# Patient Record
Sex: MALE | Race: OTHER | Employment: FULL TIME | URBAN - METROPOLITAN AREA
[De-identification: names, ages, dates, MRNs, and addresses within clinical notes are randomized per-mention and may not be internally consistent; named-entity substitution may affect disease eponyms.]

---

## 2017-01-31 ENCOUNTER — OFFICE VISIT (OUTPATIENT)
Dept: FAMILY MEDICINE CLINIC | Age: 32
End: 2017-01-31

## 2017-01-31 VITALS — HEIGHT: 67 IN | WEIGHT: 166 LBS | BODY MASS INDEX: 26.06 KG/M2

## 2017-01-31 DIAGNOSIS — E73.9 LACTOSE INTOLERANCE IN ADULT: ICD-10-CM

## 2017-01-31 DIAGNOSIS — K29.50 OTHER CHRONIC GASTRITIS WITHOUT HEMORRHAGE: Primary | ICD-10-CM

## 2017-01-31 RX ORDER — OMEPRAZOLE 40 MG/1
40 CAPSULE, DELAYED RELEASE ORAL DAILY
Qty: 30 CAP | Refills: 0 | Status: SHIPPED | OUTPATIENT
Start: 2017-01-31 | End: 2017-10-17 | Stop reason: SDUPTHER

## 2017-01-31 NOTE — PROGRESS NOTES
Assessment/Plan:       ICD-10-CM ICD-9-CM    1. Other chronic gastritis without hemorrhage K29.50  omeprazole (PRILOSEC) 40 mg capsule   2. Lactose intolerance in adult E73.9 271.3      Follow-up Disposition:  Return if symptoms worsen or fail to improve. [facility]  Subjective:   Yusra Ocampo is a 27 y.o. OTHER male who speaks Lao. Chief Complaint   Patient presents with    Epigastric Pain     pt c/o severe pain after eating and diarreah x2 years but worse x1 month, pain radiates to the back    Head Pain     x1 year    History of Present Illness: diarrhea depends on the food. Has pain every day after eating, last time was last night. At 9 pm, eats once a day. The rest of the day drinks water. When he eats, his stomach bloats. For 3 months has been eating once a day. Has had the pain for 10 years but it's been worse over 3 months. Had gastritis in [facility] 17 years ago. Fried chicken, homemade rice,  Bananas fried but not spicy, and water. Moves to the back. Stays for an hour. Takes tylenol, but usually goes away by itself. No diarrhea last night. Milk and lactose gives him diarrhea. No constipation. No blood in stool. Has nausea sometimes but not vomiting. Feels like the food is stuck and causes pain. Drinks orange juice but that gives him acid, oatmeal, water. Drinks Aloe Vera juice and that helps. He weighs between 160-180. Sometimes feels short of breath and is asking for something for his blood pressure. When he is worried about something he feels stabbing pain. Review of Systems: Negative for: fever, shortness of breath, leg swelling, exertional dyspnea, palpitations. Past Surgical History: He  has no past surgical history on file. Social History: He  reports that he has never smoked. He does not have any smokeless tobacco history on file. He reports that he does not drink alcohol or use illicit drugs.   Objective:   138 82  Vitals:    01/31/17 0952 Weight: 166 lb (75.3 kg)   Height: 5' 7.13\" (1.705 m)    No LMP for male patient. Wt Readings from Last 2 Encounters:   01/31/17 166 lb (75.3 kg)   04/19/16 173 lb 3.2 oz (78.6 kg)     Lab Review:No results found for any visits on 01/31/17. Physical Examination:   General appearance - well developed, no acute distress. Chest - clear to auscultation. Heart - regular rate and rhythm without murmurs, rubs, or gallops. Abdomen - bowel sounds present x 4, ND; tender low abdomen, midline; no CVAT; no rebound tenderness. Extremities - pulses intact. No peripheral edema. Assessment/Plan:   Fartun Jin was seen today for epigastric pain and head pain. Diagnoses and all orders for this visit:    Other chronic gastritis without hemorrhage  -     omeprazole (PRILOSEC) 40 mg capsule; Take 1 Cap by mouth daily. For reflux (acid); Ana 1 tab diario para acides    Lactose intolerance in adult      Follow-up Disposition:  Return if symptoms worsen or fail to improve.   -no caffeine/sodas; eat more than once a day and in smaller quantities; take new medication for 1 month; reinforce no milk products; no fried foods  Megan Schuster, MSN, RN, FNP-BC, BC-ADM  Celina Menjivar expressed understanding of this plan.

## 2017-01-31 NOTE — PROGRESS NOTES
Statements below were documented by Les Huang RN Patient discharged home with AVS and Medication teaching . No further questions. Reviewed patient's medications, how to take, where to pickup and if any refills, patient verbalized understanding and has no questions. My  for this patient visit was. Razia Brunner.  Les Huang RN

## 2017-01-31 NOTE — PROGRESS NOTES
Coordination of Care  1. Have you been to the ER, urgent care clinic since your last visit? Hospitalized since your last visit? Yes When: [facility] Where: 04/2016 back pain      2. Have you seen or consulted any other health care providers outside of the [address] [place] [address] since your last visit? Include any pap smears or colon screening. No    Medications  Medication Reconciliation Performed: no  Patient does not need refills     Learning Assessment Complete?  yes

## 2017-01-31 NOTE — PATIENT INSTRUCTIONS
Enfermedad de reflujo gastroesofágico (GERD): Instrucciones de cuidado - [ Gastroesophageal Reflux Disease (GERD): Care Instructions ]  Instrucciones de cuidado    La enfermedad de reflujo gastroesofágico (GERD, por taj siglas en inglés) es cuando el ácido estomacal se devuelve hacia el esófago. El esófago es el conducto que va de la garganta al [facility]. Patricia válvula de patricia roxanna vía brody que el ácido del estómago se devuelva por bhavya conducto. Cuando usted tiene GERD, esta válvula no se tania lo suficientemente firme. Si usted tiene síntomas leves de GERD, jesica acidez estomacal, es posible que pueda controlar el problema con antiácidos o medicamentos de [employer]. Cambiar la alimentación, bajar de peso y hacer otros cambios en el estilo de heber también pueden ayudar a reducir los síntomas. La atención de seguimiento es patricia parte clave de moreno tratamiento y seguridad. Asegúrese de hacer y acudir a todas las citas, y llame a moreno médico si está teniendo problemas. También es patricia buena idea saber los resultados de los exámenes y mantener patricia lista de los medicamentos que yanedl. ¿Cómo puede cuidarse en el hogar? · [employer] medicamentos exactamente jesica le fueron recetados. Llame a moreno médico si ivan estar teniendo problemas con moreno medicamento. · Moreno médico le podría recomendar medicamentos de [employer]. Para la indigestión leve u ocasional pueden servirle los antiácidos jesica Tums, Gaviscon, Mylanta o Maalox. Moreno médico también podría recomendar reductores de ácido de venta johanny, jesica Pepcid AC, Tagamet HB, Zantac 75 o Prilosec. Julia y siga todas las instrucciones de la [place]. Si [place] estos medicamentos con frecuencia, hable con moreno médico.  · Cambie taj hábitos alimenticios. ¨ Es mejor comer varias comidas pequeñas en lugar de dos o alicia comidas abundantes. ¨ Después de comer, espere de 2 a 3 horas antes de recostarse. ¨ El chocolate, la menta y el alcohol pueden empeorar la GERD.   ¨ En [employer], los alimentos condimentados, los alimentos que tienen mucho ácido (jesica los tomates y las naranjas) y el café pueden empeorar los síntomas de la GERD. Si shawanda síntomas empeoran después de comer un determinado alimento, se recomienda que deje de comer hal alimento para gordon si shawanda síntomas mejoran. · No fume ni masque tabaco. Fumar puede agravar la GERD. Si necesita ayuda para dejar de usar tabaco, hable con moreno médico [employer] y medicamentos para dejar de usarlo. Éstos pueden aumentar shawanda probabilidades de dejar el hábito para siempre. · Si tiene síntomas de GERD terry la noche, eleve la cabecera de moreno cama entre 6 y 6 pulgadas (entre 15 y 20 cm) colocando ladrillos debajo del rebecca de la cama o patricia cuña de espuma debajo de la cabecera del colchón. (Usar almohadas adicionales no funciona). · No use ropa que le ajuste mucho la parte media del cuerpo. · Baje de peso, si necesita hacerlo. Perder sólo de 5 a 10 libras (2.3 a 4.5 kg) puede ayudarle. ¿Cuándo debe pedir ayuda? Llame a moreno médico ahora mismo o busque atención médica inmediata si:  · Tiene un dolor de estómago nuevo o distinto. · Shawanda heces son negruzcas y parecidas al alquitrán o tienen rastros de Analia Garza. Preste especial atención a los cambios en moreno sandy y asegúrese de comunicarse con moreno médico si:  · Shawanda síntomas no gracia troy después de 2 días. · La comida parece quedarle atorada en la garganta o el pecho. ¿Dónde puede encontrar más información en inglés? Camille Vega a [email or web address] Ida Blanco K726 en la búsqueda para aprender más acerca de \"Enfermedad de reflujo gastroesofágico (GERD): Instrucciones de cuidado - [ Gastroesophageal Reflux Disease (GERD): Care Instructions ]. \"  Revisado: 9 agosto, 2016  Versión del contenido: 11.1  © 6064-5785 [employer], [employer]. Las instrucciones de cuidado fueron adaptadas bajo licencia por [employer] (which disclaims liability or warranty for this information). Si usted tiene [place] afección médica o sobre estas instrucciones, siempre pregunte a moreno profesional de sandy. [facility] niega toda garantía o responsabilidad por moreno uso de esta información.

## 2017-10-02 ENCOUNTER — HOSPITAL ENCOUNTER (OUTPATIENT)
Dept: LAB | Age: 32
Discharge: HOME | End: 2017-10-02

## 2017-10-02 ENCOUNTER — OFFICE VISIT (OUTPATIENT)
Dept: FAMILY MEDICINE CLINIC | Age: 32
End: 2017-10-02

## 2017-10-02 VITALS
DIASTOLIC BLOOD PRESSURE: 81 MMHG | HEART RATE: 61 BPM | BODY MASS INDEX: 27.09 KG/M2 | SYSTOLIC BLOOD PRESSURE: 132 MMHG | TEMPERATURE: 97.7 F | WEIGHT: 173.6 LBS

## 2017-10-02 DIAGNOSIS — R10.30 LOWER ABDOMINAL PAIN: Primary | ICD-10-CM

## 2017-10-02 DIAGNOSIS — R10.30 LOWER ABDOMINAL PAIN: ICD-10-CM

## 2017-10-02 DIAGNOSIS — Z83.79 FAMILY HISTORY OF COLITIS IN MOTHER: ICD-10-CM

## 2017-10-02 DIAGNOSIS — R19.5 CHANGE IN CONSISTENCY OF STOOL: ICD-10-CM

## 2017-10-02 DIAGNOSIS — R19.8 ALTERNATING CONSTIPATION AND DIARRHEA: ICD-10-CM

## 2017-10-02 LAB
ALBUMIN SERPL-MCNC: 4.6 G/DL (ref 3.5–5)
ALBUMIN/GLOB SERPL: 1.3 {RATIO} (ref 1.1–2.2)
ALP SERPL-CCNC: 93 U/L (ref 45–117)
ALT SERPL-CCNC: 103 U/L (ref 12–78)
ANION GAP SERPL CALC-SCNC: 4 MMOL/L (ref 5–15)
AST SERPL-CCNC: 32 U/L (ref 15–37)
BASOPHILS # BLD: 0 K/UL (ref 0–0.1)
BASOPHILS NFR BLD: 0 % (ref 0–1)
BILIRUB SERPL-MCNC: 0.5 MG/DL (ref 0.2–1)
BILIRUB UR QL STRIP: NORMAL
BUN SERPL-MCNC: 15 MG/DL (ref 6–20)
BUN/CREAT SERPL: 21 (ref 12–20)
CALCIUM SERPL-MCNC: 9.4 MG/DL (ref 8.5–10.1)
CHLORIDE SERPL-SCNC: 103 MMOL/L (ref 97–108)
CO2 SERPL-SCNC: 31 MMOL/L (ref 21–32)
CREAT SERPL-MCNC: 0.73 MG/DL (ref 0.7–1.3)
CRP SERPL HS-MCNC: 0.4 MG/L
EOSINOPHIL # BLD: 0.2 K/UL (ref 0–0.4)
EOSINOPHIL NFR BLD: 4 % (ref 0–7)
ERYTHROCYTE [DISTWIDTH] IN BLOOD BY AUTOMATED COUNT: 13.3 % (ref 11.5–14.5)
ERYTHROCYTE [SEDIMENTATION RATE] IN BLOOD: 2 MM/HR (ref 0–15)
GLOBULIN SER CALC-MCNC: 3.5 G/DL (ref 2–4)
GLUCOSE SERPL-MCNC: 88 MG/DL (ref 65–100)
GLUCOSE UR-MCNC: NEGATIVE MG/DL
HCT VFR BLD AUTO: 45.7 % (ref 36.6–50.3)
HGB BLD-MCNC: 15.2 G/DL (ref 12.1–17)
HGB BLD-MCNC: 15.7 G/DL
KETONES P FAST UR STRIP-MCNC: NEGATIVE MG/DL
LYMPHOCYTES # BLD: 2.6 K/UL (ref 0.8–3.5)
LYMPHOCYTES NFR BLD: 53 % (ref 12–49)
MCH RBC QN AUTO: 28.9 PG (ref 26–34)
MCHC RBC AUTO-ENTMCNC: 33.3 G/DL (ref 30–36.5)
MCV RBC AUTO: 86.9 FL (ref 80–99)
MONOCYTES # BLD: 0.4 K/UL (ref 0–1)
MONOCYTES NFR BLD: 9 % (ref 5–13)
NEUTS SEG # BLD: 1.6 K/UL (ref 1.8–8)
NEUTS SEG NFR BLD: 34 % (ref 32–75)
PH UR STRIP: 6 [PH] (ref 4.6–8)
PLATELET # BLD AUTO: 191 K/UL (ref 150–400)
POTASSIUM SERPL-SCNC: 4.7 MMOL/L (ref 3.5–5.1)
PROT SERPL-MCNC: 8.1 G/DL (ref 6.4–8.2)
PROT UR QL STRIP: NEGATIVE MG/DL
RBC # BLD AUTO: 5.26 M/UL (ref 4.1–5.7)
SODIUM SERPL-SCNC: 138 MMOL/L (ref 136–145)
SP GR UR STRIP: 1.03 (ref 1–1.03)
UA UROBILINOGEN AMB POC: NORMAL (ref 0.2–1)
URINALYSIS CLARITY POC: CLEAR
URINALYSIS COLOR POC: YELLOW
URINE BLOOD POC: NEGATIVE
URINE LEUKOCYTES POC: NEGATIVE
URINE NITRITES POC: NEGATIVE
WBC # BLD AUTO: 4.8 K/UL (ref 4.1–11.1)

## 2017-10-02 PROCEDURE — 86141 C-REACTIVE PROTEIN HS: CPT | Performed by: FAMILY MEDICINE

## 2017-10-02 PROCEDURE — 80053 COMPREHEN METABOLIC PANEL: CPT | Performed by: FAMILY MEDICINE

## 2017-10-02 PROCEDURE — 85652 RBC SED RATE AUTOMATED: CPT | Performed by: FAMILY MEDICINE

## 2017-10-02 PROCEDURE — 85025 COMPLETE CBC W/AUTO DIFF WBC: CPT | Performed by: FAMILY MEDICINE

## 2017-10-02 NOTE — PROGRESS NOTES
Results for orders placed or performed in visit on 10/02/17   AMB POC HEMOGLOBIN (HGB)   Result Value Ref Range    Hemoglobin (POC) 15.7    AMB POC URINALYSIS DIP STICK AUTO W/O MICRO   Result Value Ref Range    Color (UA POC) Yellow     Clarity (UA POC) Clear     Glucose (UA POC) Negative Negative    Bilirubin (UA POC)  Negative    Ketones (UA POC) Negative Negative    Specific gravity (UA POC) 1.030 1.001 - 1.035    Blood (UA POC) Negative Negative    pH (UA POC) 6.0 4.6 - 8.0    Protein (UA POC) Negative Negative mg/dL    Urobilinogen (UA POC) normal 0.2 - 1    Nitrites (UA POC) Negative Negative    Leukocyte esterase (UA POC) Negative Negative

## 2017-10-02 NOTE — MR AVS SNAPSHOT
Visit Information Kiesha Beltre Personal Médico Departamento Teléfono del Dep. Número de visita  
 10/2/2017  1:30 PM Jacek Joseph MD [place] [address] [address]ales [phone] Upcoming Health Maintenance Date Due DTaP/Tdap/Td series (1 - Tdap) 10/29/2005 INFLUENZA AGE 9 TO ADULT 8/1/2017 Alergias  Review Complete El: 10/2/2017 Por: Troy Thompson V A partir del:  10/2/2017 No Known Allergies Vacunas actuales Idalia Brown No hay ninguna vacuna archivada. No revisadas esta visita You Were Diagnosed With   
  
 Dandre Longo Change in consistency of stool    -  Primary ICD-10-CM: R19.5 ICD-9-CM: 787.7 Lower abdominal pain     ICD-10-CM: R10.30 ICD-9-CM: 789.09 Partes vitales PS Pulso Temperatura Peso (percentil de crecimiento) BMI (C) Estatus de tabaquísmo 132/81 (BP 1 Location: Left arm, BP Patient Position: Sitting) 61 97.7 °F (36.5 °C) (Oral) 173 lb 9.6 oz (78.7 kg) 27.09 kg/m2 Never Smoker Historial de signos vitales BMI and BSA Data Body Mass Index Body Surface Area  
 27.09 kg/m 2 1.93 m 2 [facility] Name Phone [facility] [address] [place] [address] Whatley lista de medicamentos actualizada Lista actualizada el: 10/2/17  2:31 PM.  Loi Ochoa use whatley lista de medicamentos más reciente. acetaminophen 325 mg tablet También conocido jesica:  TYLENOL Take  by mouth every four (4) hours as needed for Pain.  
  
 diazePAM 5 mg tablet También conocido jesica:  VALIUM Take 1 Tab by mouth every eight (8) hours as needed (Pain). Max Daily Amount: 15 mg.  
  
 ibuprofen 800 mg tablet También conocido jesica:  MOTRIN Take 1 Tab by mouth every eight (8) hours as needed for Pain. methocarbamol 500 mg tablet También conocido jesica:  ROBAXIN Take 1 tablet by mouth three (3) times daily. omeprazole 40 mg capsule También conocido jesica:  Caesar Collet Take 1 Cap by mouth daily. For reflux (acid); Ana 1 tab diario para acides  
  
 oxyCODONE-acetaminophen 5-325 mg per tablet También conocido jesica:  PERCOCET Take 1-2 Tabs by mouth every six (6) hours as needed for Pain. Max Daily Amount: 8 Tabs. Hicimos lo siguiente AMB POC HEMOGLOBIN (HGB) [11368 CPT(R)] AMB POC URINALYSIS DIP STICK AUTO W/O MICRO [96397 CPT(R)] Por hacer 10/02/2017 Microbiology:  CULTURE, STOOL   
  
 10/02/2017 Lab:  [place], STOOL   
  
 10/02/2017 Microbiology:  OVA & PARASITES, STOOL   
  
 10/02/2017 Lab:  WBC, STOOL Introducing [facility]! [facility] introduce portal paciente MyChart . Ahora se puede acceder a partes de moreno expediente médico, enviar por correo electrónico la oficina de moreno médico y solicitar renovaciones de medicamentos en línea. En moreno navegador de Internet , Ganesh Villanueva a [email or web address] Kyra clic en el usuario por Arelia Arms? [employer] clic aquí en la sesión [facility]. Verá la página de registro [place]. Ingrese moreno código de [employer] savita y jesica aparece a continuación. Usted no tendrá que [employer] código después de yuval completado el proceso de registro . Si usted no se inscribe antes de la fecha de caducidad , debe solicitar un nuevo código. · MyChart Código de acceso : [id number] Expires: 12/31/2017  2:17 PM 
 
Ingresa los últimos cuatro dígitos de moreno Número de Seguro Social ( xxxx ) y fecha de nacimiento ( dd / mm / aaaa ) jesica se indica y kyra clic en Enviar. Usted será llevado a la siguiente página de registro . Crear un ID MyChart . Esta será moreno ID de inicio de sesión de MyChart y no puede ser [place] , por lo que pensar en patricia que es Barbara Primus y fácil de recordar . Crear patricia contraseña MyChart . Usted puede cambiar moreno contraseña en cualquier momento . Ingrese mroeno Password Reset de preguntas y [place] . [place] se puede utilizar en un momento posterior si usted olvida moreno contraseña. Introduzca moreno dirección de correo electrónico . Arcelia Rasmussen recibirá patricia notificación por correo electrónico cuando la nueva información está disponible en MyChart . Minjeydarvin Mount clic en Registrarse. Conway Mable gordon y descargar porciones de moreno expediente médico. 
Matias clic en el enlace de descarga del menú Resumen para descargar patricia copia portátil de moreno información médica . Si tiene [employer] , por favor visite la sección de preguntas frecuentes del sitio web [employer] . Recuerde, [employer] NO es que se utilizará para las necesidades urgentes. Para emergencias médicas , llame al 911 . Ahora disponible en moreno iPhone y Android ! Por favor proporcione bhavya resumen de la documentación de cuidado a moreno próximo proveedor. Your primary care clinician is listed as NONE. If you have any questions after today's visit, please call [phone].

## 2017-10-02 NOTE — PROGRESS NOTES
AVS reviewed and given. Instructions and supplies given for stool H-pylori, stool culture and ova and parasite stool testing. Patient aware to rtc in about 1 month for f/u. Patient verbalized understanding. No questions or concerns at this time.  Rolanda Lehman RN

## 2017-10-02 NOTE — PROGRESS NOTES
Coordination of Care  1. Have you been to the ER, urgent care clinic since your last visit? Hospitalized since your last visit? No    2. Have you seen or consulted any other health care providers outside of the [address] [place] [address] since your last visit? Include any pap smears or colon screening. No    Medications  Medication Reconciliation Performed: no  Patient does not need refills     Learning Assessment Complete?  yes

## 2017-10-02 NOTE — PROGRESS NOTES
Rom Cruz is a 27 y.o. male    Issues discussed today include:    1) Abdominal pain and bloating:  Started 3 years ago. Initially worsening and lately the same. Is constantly tender in lower abd, but pain worsens after eating fatty, spicy and acidic foods. Is 8/10 when most severe. Tried prilosec in Jan, says it didn't help. Hasn't taken any other meds for this. Nothing else alleviates. + off and on diarrhea, but more often has constipation. No blood or mucus in stool. Feels fatigued, occasional dyspnea - these sxs come on suddenly when at rest, not with exertion and pt cannot explain where coming from. Denies anxiety. No h/o abdominal surgery. No blood in stool. No weight loss. He says mother with similar GI sxs. He calls his mother during appt and asks me to talk with her - she has h/o inflammatory colitis, had been on steroid before. She denies being told she had UC or crohn's. No family h/o colon cancer. Data reviewed or ordered today:       Other problems include: There is no problem list on file for this patient. Medications:  Current Outpatient Prescriptions   Medication Sig Dispense Refill    omeprazole (PRILOSEC) 40 mg capsule Take 1 Cap by mouth daily. For reflux (acid); Ana 1 tab diario para acides 30 Cap 0    acetaminophen (TYLENOL) 325 mg tablet Take  by mouth every four (4) hours as needed for Pain. Allergies:  No Known Allergies    LMP:  No LMP for male patient. Social History     Social History    Marital status: SINGLE     Spouse name: N/A    Number of children: N/A    Years of education: N/A     Occupational History    Not on file. Social History Main Topics    Smoking status: Never Smoker    Smokeless tobacco: Never Used    Alcohol use No    Drug use: No    Sexual activity: Not on file     Other Topics Concern    Not on file     Social History Narrative    ** Merged History Encounter **            No family history on file.     ROS:   Chest Pain: No  SOB:  No      Physical Exam  Visit Vitals    /81 (BP 1 Location: Left arm, BP Patient Position: Sitting)    Pulse 61    Temp 97.7 °F (36.5 °C) (Oral)    Wt 173 lb 9.6 oz (78.7 kg)    BMI 27.09 kg/m2     BP Readings from Last 3 Encounters:   10/02/17 132/81   04/19/16 149/82   10/17/14 133/81     Constitutional: Appears well,  No acute distress, Vitals noted  Psychiatric:  Affect normal, Alert and Oriented to person/place/time  Eyes:  Conjunctiva clear, no drainage  ENT:  External ears and nose normal, Teeth and gums appear healthy, Mucous membranes moist  Neck:  General inspection normal. Supple. Heart:  Normal HR, Normal S1 and S2,  Regular rhythm. No murmurs, rubs or gallops.   Lungs:  Clear to auscultation, good respiratory effort, no wheezes, rales or rhonchi  Abdomen: Soft, nondistended, + tenderness in bilateral lower abd, + voluntary guarding, no rebound, no CVAT  Extremities:  Without edema, good peripheral pulses  Skin:  Warm to palpation, without rashes    Results for orders placed or performed in visit on 10/02/17   AMB POC URINALYSIS DIP STICK AUTO W/O MICRO     Status: None   Result Value Ref Range Status    Color (UA POC) Yellow  Final    Clarity (UA POC) Clear  Final    Glucose (UA POC) Negative Negative Final    Bilirubin (UA POC)  Negative     Ketones (UA POC) Negative Negative Final    Specific gravity (UA POC) 1.030 1.001 - 1.035 Final    Blood (UA POC) Negative Negative Final    pH (UA POC) 6.0 4.6 - 8.0 Final    Protein (UA POC) Negative Negative mg/dL Final    Urobilinogen (UA POC) normal 0.2 - 1 Final    Nitrites (UA POC) Negative Negative Final    Leukocyte esterase (UA POC) Negative Negative Final               Lab Results   Component Value Date/Time    Hemoglobin (POC) 15.7 10/02/2017 02:31 PM    HGB 15.2 10/02/2017 02:25 PM         Assessment/Plan:      ICD-10-CM ICD-9-CM    1. Lower abdominal pain R10.30 789.09 AMB POC HEMOGLOBIN (HGB)      CBC WITH AUTOMATED DIFF METABOLIC PANEL, COMPREHENSIVE      SED RATE (ESR)      CRP, HIGH SENSITIVITY      H PYLORI AG, STOOL      AMB POC URINALYSIS DIP STICK AUTO W/O MICRO      CULTURE, STOOL      OVA & PARASITES, STOOL      WBC, STOOL   2. Alternating constipation and diarrhea R19.8 787.99 AMB POC HEMOGLOBIN (HGB)      CBC WITH AUTOMATED DIFF      METABOLIC PANEL, COMPREHENSIVE      CULTURE, STOOL      OVA & PARASITES, STOOL      WBC, STOOL   3. Family history of colitis in mother Z80.78 V21.59 SED RATE (ESR)      CRP, HIGH SENSITIVITY       POC hgb wnl  Check labs to r/o inflammatory process, if so will send to GI for eval  Infectious stool studies    Follow-up Disposition:  Return in about 2 weeks (around 10/16/2017), or if symptoms worsen or fail to improve.         Guzman Turner MD  [address] [place] [address]

## 2017-10-03 PROCEDURE — 87177 OVA AND PARASITES SMEARS: CPT | Performed by: FAMILY MEDICINE

## 2017-10-04 ENCOUNTER — HOSPITAL ENCOUNTER (OUTPATIENT)
Dept: LAB | Age: 32
Discharge: HOME | End: 2017-10-04

## 2017-10-04 ENCOUNTER — CLINICAL SUPPORT (OUTPATIENT)
Dept: FAMILY MEDICINE CLINIC | Age: 32
End: 2017-10-04

## 2017-10-04 DIAGNOSIS — R19.8 ALTERNATING CONSTIPATION AND DIARRHEA: ICD-10-CM

## 2017-10-04 DIAGNOSIS — R10.30 LOWER ABDOMINAL PAIN: ICD-10-CM

## 2017-10-04 DIAGNOSIS — Z13.9 ENCOUNTER FOR SCREENING: Primary | ICD-10-CM

## 2017-10-04 PROCEDURE — 87177 OVA AND PARASITES SMEARS: CPT | Performed by: FAMILY MEDICINE

## 2017-10-04 PROCEDURE — 87045 FECES CULTURE AEROBIC BACT: CPT | Performed by: FAMILY MEDICINE

## 2017-10-04 PROCEDURE — 87338 HPYLORI STOOL AG IA: CPT | Performed by: FAMILY MEDICINE

## 2017-10-04 PROCEDURE — 89055 LEUKOCYTE ASSESSMENT FECAL: CPT | Performed by: FAMILY MEDICINE

## 2017-10-04 NOTE — PROGRESS NOTES
Patient here for labs only, labs dropped off was an WBC Stool, Ova & Parasites Stool x 2, H Pylori ag Stool. Patient was advise that a Dr or Nurse will call with the results if abnormal and if normal no phone call will be done. Also the patient was advised she/he can discuss the results at next visit with the Dr. Norman Lazaro, Medical Assistant.

## 2017-10-05 DIAGNOSIS — R74.8 ELEVATED LIVER ENZYMES: Primary | ICD-10-CM

## 2017-10-05 LAB — WBC #/AREA STL HPF: NORMAL /HPF (ref 0–4)

## 2017-10-05 NOTE — PROGRESS NOTES
Nurse to please call and inform patient of the following (** represent recommendations for patient) - thank you:  ESR wnl, neg inflammatory marker does not support inflammatory/autoimmune colitis  CMP - no kidney, sugar or electrolyte problems identified  ** one liver enzyme is elevated. He should have hepatitis panel to r/o infectious etiology, f/u lab appt recommend and labs ordered. ** recommend appt in 3 months with provider for follow up  CBC - no anemia or elevated WBC.   ** Slightly elevated lymphocyte count, supports viral infection    PLEASE NOTE: addition labs to be resulted in separate note (stool studies pending), ok to wait so all can be discussed in same phone call. Thank you.

## 2017-10-06 LAB
BACTERIA SPEC CULT: NORMAL
C JEJUNI+C COLI AG STL QL: NEGATIVE
E COLI SXT1+2 STL IA: NEGATIVE
SERVICE CMNT-IMP: NORMAL

## 2017-10-07 LAB
H PYLORI AG STL QL IA: POSITIVE
SPECIMEN SOURCE: ABNORMAL

## 2017-10-09 ENCOUNTER — HOSPITAL ENCOUNTER (OUTPATIENT)
Dept: LAB | Age: 32
Discharge: HOME | End: 2017-10-09

## 2017-10-09 ENCOUNTER — CLINICAL SUPPORT (OUTPATIENT)
Dept: FAMILY MEDICINE CLINIC | Age: 32
End: 2017-10-09

## 2017-10-09 DIAGNOSIS — R74.8 ELEVATED LIVER ENZYMES: ICD-10-CM

## 2017-10-09 LAB
O+P SPEC MICRO: NORMAL
O+P SPEC MICRO: NORMAL
O+P STL CONC: NORMAL
SPECIMEN SOURCE: NORMAL
SPECIMEN SOURCE: NORMAL

## 2017-10-09 PROCEDURE — 80074 ACUTE HEPATITIS PANEL: CPT | Performed by: FAMILY MEDICINE

## 2017-10-09 PROCEDURE — 87177 OVA AND PARASITES SMEARS: CPT | Performed by: FAMILY MEDICINE

## 2017-10-09 NOTE — PROGRESS NOTES
O&P stool pending  H. Pylori stool antigen POSITIVE  ** please help patient to make follow to discuss treatment, can discuss other labs results and stool study result at that time

## 2017-10-10 LAB
HAV IGM SER QL: NONREACTIVE
HBV CORE IGM SER QL: NONREACTIVE
HBV SURFACE AG SER QL: 0.4 INDEX
HBV SURFACE AG SER QL: NEGATIVE
HCV AB SERPL QL IA: NONREACTIVE
HCV COMMENT,HCGAC: NORMAL
SP1: NORMAL
SP2: NORMAL
SP3: NORMAL

## 2017-10-10 NOTE — PROGRESS NOTES
2 species of nonpathogenic protozoa identified on stool O&P exam.  Serial O&P specimen collected today. The presence of these organisms suggests h/o ingestion of contaminated water/food, but no treatment is recommended for these organisms. Given + H.  Pylori - we will plan to treat this first.  If sxs persist despite tx, we can consider specialized stool studies with acid fast or flourescent staining to detect other potential lower GI infections  ** please help pt make appt with me or other provider in the next 1-2 weeks, thank you

## 2017-10-13 LAB
O+P SPEC MICRO: NORMAL
O+P STL CONC: NORMAL
SPECIMEN SOURCE: NORMAL

## 2017-10-16 ENCOUNTER — TELEPHONE (OUTPATIENT)
Dept: FAMILY MEDICINE CLINIC | Age: 32
End: 2017-10-16

## 2017-10-16 NOTE — PROGRESS NOTES
Pt is coming to clinic tomorrow to see provider for follow up appointment. I did talk to him today as he called to ask about labs. We only discussed the need to come back to treat him with antibiotics for H.Pylori and to explain more thoroughly his stool results. He does not know he needs to have hepatitis panel. I sent a message to Dr. Ashly Bardales to alert him of follow up appointment with her tomorrow and to let her know these additional labs can be discussed tomorrow.  Sarah Gr RN

## 2017-10-16 NOTE — TELEPHONE ENCOUNTER
----- Message from Kiarra Perez RN sent at 10/16/2017  3:21 PM EDT -----  Pt is coming to clinic tomorrow to see provider for follow up appointment. I did talk to him today as he called to ask about labs. We only discussed the need to come back to treat him with antibiotics for H.Pylori and to explain more thoroughly his stool results. He does not know he needs to have hepatitis panel. I sent a message to Dr. Fernando Sahni to alert him of follow up appointment with her tomorrow and to let her know these additional labs can be discussed tomorrow.  Kiarra Perez RN

## 2017-10-16 NOTE — PROGRESS NOTES
I called pt today with [employer]  # [phone] and gave message from provider. Pt said his stomach is really bothering him and he is anxious to be seen. I scheduled him for appointment tomorrow at end of provider[facility].  Marianela Yang RN

## 2017-10-17 ENCOUNTER — OFFICE VISIT (OUTPATIENT)
Dept: FAMILY MEDICINE CLINIC | Age: 32
End: 2017-10-17

## 2017-10-17 VITALS
SYSTOLIC BLOOD PRESSURE: 128 MMHG | DIASTOLIC BLOOD PRESSURE: 77 MMHG | TEMPERATURE: 98.2 F | HEIGHT: 68 IN | WEIGHT: 175 LBS | HEART RATE: 58 BPM | BODY MASS INDEX: 26.52 KG/M2

## 2017-10-17 DIAGNOSIS — R10.30 LOWER ABDOMINAL PAIN: ICD-10-CM

## 2017-10-17 DIAGNOSIS — A04.8 H. PYLORI INFECTION: Primary | ICD-10-CM

## 2017-10-17 RX ORDER — CLARITHROMYCIN 500 MG/1
500 TABLET, FILM COATED ORAL 2 TIMES DAILY
Qty: 28 TAB | Refills: 0 | Status: SHIPPED | OUTPATIENT
Start: 2017-10-17 | End: 2017-10-31

## 2017-10-17 RX ORDER — OMEPRAZOLE 40 MG/1
40 CAPSULE, DELAYED RELEASE ORAL DAILY
Qty: 30 CAP | Refills: 2 | Status: SHIPPED | OUTPATIENT
Start: 2017-10-17 | End: 2017-11-21 | Stop reason: SDUPTHER

## 2017-10-17 RX ORDER — AMOXICILLIN 500 MG/1
1000 CAPSULE ORAL 2 TIMES DAILY
Qty: 56 CAP | Refills: 0 | Status: SHIPPED | OUTPATIENT
Start: 2017-10-17 | End: 2017-10-31

## 2017-10-17 NOTE — PROGRESS NOTES
Patient seen for discharge and no  needed per the patient. Per [facility], the best price for the patient's prescriptions today is at [facility]. He agreed to have them sent to the [facility] and [address] [place] [address]. He was given the printed Good Rx coupons, a Good Rx coupon card, and the AVS. He understands to take both antibiotic prescriptions concurrently for the full two weeks and to take the Omeprazole daily as well for 3 months. Return as a walk-in as needed.  Jagdish Aldana RN

## 2017-10-17 NOTE — PROGRESS NOTES
Coordination of Care  1. Have you been to the ER, urgent care clinic since your last visit? Hospitalized since your last visit? No    2. Have you seen or consulted any other health care providers outside of the [address] [place] [address] since your last visit? Include any pap smears or colon screening. No    Medications  Does the patient need refills? NO    Learning Assessment Complete?  yes

## 2017-10-17 NOTE — PATIENT INSTRUCTIONS
Infección bacteriana por H. pylori: Instrucciones de cuidado - [ H. Pylori Bacterial Infection: Care Instructions ]  Instrucciones de cuidado    Moreno prueba muestra la presencia de Helicobacter pylori (H. pylori), un tipo de bacteria que vive en el revestimiento del estómago. Muchas personas tienen H. pylori en taj estómagos y no tienen problemas. Jessee algunas veces la H. pylori causa malestar estomacal o patricia llaga (úlcera) en el revestimiento del estómago. La mayoría de las úlceras estomacales son causadas por la H. pylori. Entre los síntomas de patricia úlcera se encuentran dolor persistente o abrasador en el vientre que puede durar desde unos minutos hasta horas. El consumo de alimentos o antiácidos ayuda a aliviar el dolor, [employer] síntomas podrían reaparecer después de un Shannan. Un antibiótico puede curar patricia infección por H. pylori. La atención de seguimiento es patricia parte clave de moreno tratamiento y seguridad. Asegúrese de hacer y acudir a todas las citas, y llame a moreno médico si está teniendo problemas. También es patricia buena idea saber los resultados de los exámenes y mantener patricia lista de los medicamentos que yandel. Cómo puede cuidarse en el hogar? · [address] No deje de tomarlos por el hecho de sentirse mejor. Debe joslyn todos los antibióticos hasta terminarlos. · Si moreno médico le receta otros medicamentos, tómelos exactamente según las indicaciones. Llame a moreno médico si ivan estar teniendo problemas con moreno medicamento. Recibirá más detalles sobre el medicamento específico recetado por moreno médico.  · Siga patricia dieta saludable y balanceada. ¨ Coma comidas más pequeñas, con mayor frecuencia. Asegúrese de consumir por lo menos alicia comidas al día. ¨ Evite los alimentos grasosos o muy condimentados. ¨ No tome bebidas que contengan cafeína si le hacen mal al [place]. Éstas incluyen café, té y sodas. · No fume.  El hábito de fumar demora la curación de moreno [place] y [place] provocar la recurrencia de Ronn Casey. Si necesita ayuda para dejar de fumar, hable con moreno médico [employer] y medicamentos para dejar de fumar. Éstos pueden aumentar shawanda probabilidades de dejar el hábito para siempre. · Limite la cantidad de alcohol que stanislav. El alcohol puede retrasar la curación de la úlcera y puede [employer] síntomas. · Lávese las cayetano después de ir al baño. · Evite joslyn aspirina, ibuprofeno u otros antiinflamatorios, ya que pueden irritar el estómago. Si necesita un analgésico (medicamento para el dolor), trate de joslyn acetaminofén (Tylenol). Cuándo debe pedir ayuda? Llame al 911 en cualquier momento que considere que necesita atención de emergencia. Por ejemplo, llame si:  · Se desmayó (perdió el conocimiento). · Vomita monica o algo parecido a granos de café molido. · Las heces son de color rojizo o muy sanguinolentas (con monica). Llame a moreno médico ahora mismo o busque atención médica inmediata si:  · Tiene dolor intenso en el vientre, la espalda o los hombros.  · Tiene dolor abdominal nuevo o que empeora. · Siente mareos o aturdimiento, o que está a punto de [place]. · Shawanda heces son negruzcas y parecidas al alquitrán o tienen rastros de [place]. Preste especial atención a los cambios en moreno sandy y asegúrese de comunicarse con moreno médico si:  · Tiene síntomas nuevos jesica pérdida de peso, náuseas o vómito. · No mejora jesica se esperaba. Dónde puede encontrar más información en inglés? Shelby Lidia a http://radha-sandy.info/. Lynda Lebron U025 en la búsqueda para aprender más acerca de \"Infección bacteriana por H. pylori: Instrucciones de cuidado - [ H. Pylori Bacterial Infection: Care Instructions ]. \"  Revisado: 21 noviembre, 2016  Versión del contenido: 11.3  © 5430-7493 [employer], [employer]. Las instrucciones de cuidado fueron adaptadas bajo licencia por [employer] (which disclaims liability or warranty for this information).  Si usted tiene preguntas sobre patricia afección médica o sobre estas instrucciones, siempre pregunte a moreno profesional de sandy. [employer] niega toda garantía o responsabilidad por moreno uso de esta información.

## 2017-10-21 NOTE — PROGRESS NOTES
Jayashree Weiner is a 27 y.o. male    Issues discussed today include:    1) Abd pain, bloating follow up:  Here to f/u on labs and talk about tx. Had stool studies recently, showed + h pylori infection. Pt denies ever being tx'd for this in past. Pt c/o lower abd pain and bloating. Off and on diarrhea. Not eating much bc he is avoiding to prevent pain, diarrhea. Feels fatigued. Data reviewed or ordered today:       Other problems include: There is no problem list on file for this patient. Medications:  Current Outpatient Prescriptions   Medication Sig Dispense Refill    clarithromycin (BIAXIN) 500 mg tablet Take 1 Tab by mouth two (2) times a day for 14 days. 28 Tab 0    amoxicillin (AMOXIL) 500 mg capsule Take 2 Caps by mouth two (2) times a day for 14 days. 56 Cap 0    omeprazole (PRILOSEC) 40 mg capsule Take 1 Cap by mouth daily. For reflux (acid); Ana 1 tab diario para acides 30 Cap 2    acetaminophen (TYLENOL) 325 mg tablet Take  by mouth every four (4) hours as needed for Pain. Allergies:  No Known Allergies    LMP:  No LMP for male patient. Social History     Social History    Marital status: SINGLE     Spouse name: N/A    Number of children: N/A    Years of education: N/A     Occupational History    Not on file. Social History Main Topics    Smoking status: Never Smoker    Smokeless tobacco: Never Used    Alcohol use No    Drug use: No    Sexual activity: Not on file     Other Topics Concern    Not on file     Social History Narrative    ** Merged History Encounter **            No family history on file.     ROS:   Chest Pain:  No  SOB:  No      Physical Exam  Visit Vitals    /77 (BP 1 Location: Left arm, BP Patient Position: Sitting)    Pulse (!) 58    Temp 98.2 °F (36.8 °C) (Oral)    Ht 5' 7.52\" (1.715 m)    Wt 175 lb (79.4 kg)    BMI 26.99 kg/m2     BP Readings from Last 3 Encounters:   10/17/17 128/77   10/02/17 132/81   04/19/16 149/82 Constitutional: Appears well,  No acute distress, Vitals noted  Psychiatric:  Affect normal, Alert and Oriented to person/place/time  Eyes:  Conjunctiva clear, no drainage  ENT:  External ears and nose normal, Teeth and gums appear healthy, Mucous membranes moist  Neck:  General inspection normal. Supple. Heart:  Normal HR, Normal S1 and S2,  Regular rhythm. No murmurs, rubs or gallops. Lungs:  Clear to auscultation, good respiratory effort, no wheezes, rales or rhonchi  Abdomen: + hyperactive BS, soft, nondistended, no HSM, + tenderness in bilateral lower abdomen, L>R, + vol guarding, no rebound, no CVAT  Extremities:  Without edema, good peripheral pulses  Skin:  Warm to palpation, without rashes        Assessment/Plan:      ICD-10-CM ICD-9-CM    1. H. pylori infection A04.8 041.86 clarithromycin (BIAXIN) 500 mg tablet      amoxicillin (AMOXIL) 500 mg capsule      omeprazole (PRILOSEC) 40 mg capsule   2. Lower abdominal pain R10.30 789.09        Given + dx of h pylori - will treat this first  O&P studies with 3 orgs - 2 nonpathogenic and third lastocystis hominis can cause sxs/disease  If sxs not relieved with 2 weeks of triple therapy, will treat with flagyl in sort of staged tx course  Pt knows to come back if sxs not resolved      Follow-up Disposition:  Return In 2-3 weeks if symptoms worsen or fail to improve.         Janell Mendoza MD  [address] [place], [place] [address]nie Ata

## 2017-11-03 ENCOUNTER — DOCUMENTATION ONLY (OUTPATIENT)
Dept: FAMILY MEDICINE CLINIC | Age: 32
End: 2017-11-03

## 2017-11-03 NOTE — PROGRESS NOTES
Encounter noted  He is welcome to come see me tomorrow Sat at [facility] or any provider next week  Thanks, AP

## 2017-11-21 ENCOUNTER — OFFICE VISIT (OUTPATIENT)
Dept: FAMILY MEDICINE CLINIC | Age: 32
End: 2017-11-21

## 2017-11-21 VITALS
SYSTOLIC BLOOD PRESSURE: 121 MMHG | WEIGHT: 174 LBS | DIASTOLIC BLOOD PRESSURE: 73 MMHG | TEMPERATURE: 97.6 F | BODY MASS INDEX: 26.83 KG/M2 | HEART RATE: 55 BPM

## 2017-11-21 DIAGNOSIS — Z71.89 COUNSELING AND COORDINATION OF CARE: Primary | ICD-10-CM

## 2017-11-21 DIAGNOSIS — A07.8 BLASTOCYSTIS HOMINIS INFECTION: Primary | ICD-10-CM

## 2017-11-21 RX ORDER — OMEPRAZOLE 40 MG/1
40 CAPSULE, DELAYED RELEASE ORAL DAILY
Qty: 30 CAP | Refills: 2 | Status: SHIPPED | OUTPATIENT
Start: 2017-11-21

## 2017-11-21 RX ORDER — METRONIDAZOLE 500 MG/1
750 TABLET ORAL 3 TIMES DAILY
Qty: 23 TAB | Refills: 0 | Status: SHIPPED | OUTPATIENT
Start: 2017-11-21 | End: 2017-11-26

## 2017-11-21 NOTE — PROGRESS NOTES
Coordination of Care  1. Have you been to the ER, urgent care clinic since your last visit? Hospitalized since your last visit? No    2. Have you seen or consulted any other health care providers outside of the [address] [place] [address] since your last visit? Include any pap smears or colon screening. No    Medications  Does the patient need refills? NO    Learning Assessment Complete?  yes

## 2017-11-21 NOTE — MR AVS SNAPSHOT
Visit Information Date & Time Provider Department Dept. Phone Encounter #  
 11/21/2017  9:30 AM Nita Adams NP [facility] [id number] Upcoming Health Maintenance Date Due DTaP/Tdap/Td series (1 - Tdap) 10/29/2005 Influenza Age 5 to Adult 8/1/2017 Allergies as of 11/21/2017  Review Complete On: 11/21/2017 By: Vickie Eduardo No Known Allergies Current Immunizations  Never Reviewed No immunizations on file. Not reviewed this visit You Were Diagnosed With   
  
 Codes Comments Blastocystis hominis infection    -  Primary ICD-10-CM: A07.8 ICD-9-CM: 007.2 H. pylori infection     ICD-10-CM: A04.8 ICD-9-CM: 041.86 Vitals BP Pulse Temp Weight(growth percentile) BMI Smoking Status 121/73 (BP 1 Location: Left arm, BP Patient Position: Sitting) (!) 55 97.6 °F (36.4 °C) (Oral) 174 lb (78.9 kg) 26.83 kg/m2 Never Smoker Vitals History BMI and BSA Data Body Mass Index Body Surface Area  
 26.83 kg/m 2 1.94 m 2 [facility] Name Phone Arden Mayorga [address] [facility], [address] [facility] [place]. [phone] Your Updated Medication List  
  
   
This list is accurate as of: 11/21/17 10:55 AM.  Always use your most recent med list.  
  
  
  
  
 acetaminophen 325 mg tablet Commonly known as:  TYLENOL Take  by mouth every four (4) hours as needed for Pain. metroNIDAZOLE 500 mg tablet Commonly known as:  FLAGYL Take 1.5 Tabs by mouth three (3) times daily for 5 days. For infection. Ana 1.5 tab 3 veces al diana por 5 christianson. omeprazole 40 mg capsule Commonly known as:  PRILOSEC Take 1 Cap by mouth daily. For reflux (acid); Ana 1 tab diario para acides Recetas Enviado a la [place] Refills  
 metroNIDAZOLE (FLAGYL) 500 mg tablet 0 Sig: Take 1.5 Tabs by mouth three (3) times daily for 5 days.  For infection. Ana 1.5 tab 3 veces al diana por 5 christianson. Class: Normal  
 Pharmacy: [facility] [address] [place]. Ph #: [phone] Route: Oral  
 omeprazole (PRILOSEC) 40 mg capsule 2 Sig: Take 1 Cap by mouth daily. For reflux (acid); Ana 1 tab diario para acides Class: Normal  
 Pharmacy: [facility] [address] [place] Ph #: [phone] Route: Oral  
  
We Performed the Following REFERRAL TO GASTROENTEROLOGY [CUV72 Custom] Comments:  
 Access Now Referral Request Form: 
 
Has the patient live in the area for 6 months Yes Is the patient here on a visa No 
 
Priority: 
 
2 weeks Location: [facility] Patient Demographics: 
 
Date:  11/21/2017                          EMR# [id number] Rachele Paula 1985 Home Phone : [phone]             Cell Phone:   
 
Language :  Welsh Specialist Requested:  Gastroenterologist 
 
Reason for Request:  Continued abdominal pain after treatment for H Pylori, not able to eat, weight loss Specialist Requirements: 
 
If GYN Referral:   
Pap: n/a If Ortho Referral: MRI n/a      
XRAY: n/a Pulmonary Referrals must have :  
C-X-ray n/a OR  
CT Scan n/a Referring Provider:  Slava Gonzalez NP Informacion de [employer] de Referencia Referido por Referido a  
  
 [id number] SOPHY COLLAZO No disponible Visitas Estado [facility] de inicio Elisha Towanda final  
 1 New Request 11/21/17 11/21/18 Si moreno referencia tiene un estado de \"pending review\" o \"denied\" , informacion adicional sera enviada para apoyar el resultado de esta decision. Introducing [facility]! [facility] introduce portal paciente MyChart . Ahora se puede acceder a partes de moreno expediente médico, enviar por correo electrónico la oficina de moreno médico y solicitar renovaciones de medicamentos en línea. En moreno navegador de Internet , Dewane Desanctis a [email or web address] Kyra clic en el usuario por Napolean Copier? [employer] clic aquí en la sesión Michaud Albino. Verá la página de registro [place]. Ingrese moreno código de [employer] savita y jesica aparece a continuación. Usted no tendrá que [employer] código después de yuval completado el proceso de registro . Si usted no se inscribe antes de la fecha de caducidad , debe solicitar un nuevo código. · MyChart Código de acceso : [id number] Expires: 12/31/2017  1:17 PM 
 
Ingresa los últimos cuatro dígitos de moreno Número de Seguro Social ( xxxx ) y fecha de nacimiento ( dd / mm / aaaa ) jesica se indica y kyra clic en Enviar. Usted será llevado a la siguiente página de registro . Crear un ID MyChart . Esta será moreno ID de inicio de sesión de MyChart y no puede ser [place] , por lo que pensar en patricia que es Madlyn Manners y fácil de recordar . Crear patricia contraseña MyChart . Usted puede cambiar moreno contraseña en cualquier momento . Ingrese moreno Password Reset de preguntas y [place] . [place] se puede utilizar en un momento posterior si usted olvida moreno contraseña. Introduzca moreno dirección de correo electrónico . Debbe Cancel recibirá patricia notificación por correo electrónico cuando la nueva información está disponible en MyChart . Mario Micheal clic en Registrarse. Merdarvin Clutter gordon y descargar porciones de moreno expediente médico. 
Kyra clic en el enlace de descarga del menú Resumen para descargar patricia copia portátil de moreno información médica . Si tiene [employer] , por favor visite la sección de preguntas frecuentes del sitio web MyChart . Recuerde, MyChart NO es que se utilizará para las necesidades urgentes. Para emergencias médicas , llame al 911 . Ahora disponible en moreno iPhone y Android ! Por favor proporcione bhavya resumen de la documentación de cuidado a moreno próximo proveedor. Your primary care clinician is listed as NONE.  If you have any questions after today's visit, please call [phone].

## 2017-11-21 NOTE — PROGRESS NOTES
Assessment/Plan:       ICD-10-CM ICD-9-CM    1. Blastocystis hominis infection A07.8 007.2 metroNIDAZOLE (FLAGYL) 500 mg tablet      omeprazole (PRILOSEC) 40 mg capsule      REFERRAL TO [address], [address]  [address] [facility] [address]  Phone: [phone] Fax: [phone]    Theodore Hogan [address] [facility], [address] [facility] [place]. [address] [facility]  Phone: [phone] Fax: [phone]      [address]  Subjective:     Chief Complaint   Patient presents with    Abdominal Pain     follow up, pt continues to have pain     Headache    Leroy Richard is a 34 y.o. OTHER male. HPI: He  has no past medical history on file. 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   Review of Systems: Negative for: fever, chest pain, shortness of breath, leg swelling, exertional dyspnea, palpitations. Current Medications:   Current Outpatient Prescriptions on File Prior to Visit   Medication Sig    acetaminophen (TYLENOL) 325 mg tablet Take  by mouth every four (4) hours as needed for Pain. No current facility-administered medications on file prior to visit. Past Surgical History: He  has no past surgical history on file. Social and Family History: He  reports that he has never smoked. He has never used smokeless tobacco. He reports that he does not drink alcohol or use illicit drugs. ; family history is not on file. Objective:     Vitals:    11/21/17 0936   BP: 121/73   Pulse: (!) 55   Temp: 97.6 °F (36.4 °C)   TempSrc: Oral   Weight: 174 lb (78.9 kg)    No LMP for male patient. Wt Readings from Last 2 Encounters:   11/21/17 174 lb (78.9 kg)   10/17/17 175 lb (79.4 kg)     No results found for any visits on 11/21/17. Physical Examination:  General appearance - well developed, no acute distress. Chest - clear to auscultation.   Heart - regular rate and rhythm without murmurs, rubs, or gallops. Abdomen - bowel sounds present x 4, NT, ND. Extremities - pulses intact. No peripheral edema. Assessment/Plan:   Diagnoses and all orders for this visit:    1. Blastocystis hominis infection  -     metroNIDAZOLE (FLAGYL) 500 mg tablet; Take 1.5 Tabs by mouth three (3) times daily for 5 days. For infection. Ana 1.5 tab 3 veces al diana por 5 christianson. -     omeprazole (PRILOSEC) 40 mg capsule; Take 1 Cap by mouth daily. For reflux (acid); Ana 1 tab diario para acides  -     REFERRAL TO GASTROENTEROLOGY      Follow-up Disposition:  Return for follow up access now gi. Kenneth Paez, DNP, FNP-BC, BC-ADM  Lance Omalley expressed understanding of this plan.

## 2017-11-21 NOTE — PROGRESS NOTES
Declined flu vaccine. Reviewed AVS, prescription and pharmacy location with patient. Goodrx coupons were provided to patient. Instructed to give to pharmacist to receive discount. Directed patient to speak with Marcus Gabriel about his referral for AN. Patient verbalized understanding . No questions or concern from patient at this time.  Gorge Dickens RN

## 2017-12-01 NOTE — PROGRESS NOTES
Met with patient for [facility] Referral. Pay stubs pending for completion of financial screening.  Lewis Collins

## 2018-01-08 ENCOUNTER — DOCUMENTATION ONLY (OUTPATIENT)
Dept: FAMILY MEDICINE CLINIC | Age: 33
End: 2018-01-08

## 2018-01-08 NOTE — PROGRESS NOTES
Received notification from [facility] that pt is overincome. They cannot accept this referral to GI. Denial kell sent to be scanned [facility] connect care.   Message sent to Barney Don NP

## 2018-01-10 ENCOUNTER — TELEPHONE (OUTPATIENT)
Dept: FAMILY MEDICINE CLINIC | Age: 33
End: 2018-01-10

## 2018-01-10 NOTE — TELEPHONE ENCOUNTER
I called pt today with [employer]  # [phone] and gave message from provider. Pt is having abdominal symptoms and knows to return to discuss next steps with provider since he is over income. I also told pt to feel free to follow up with Rick Garcia at [facility] as he was surprised to find out he is over income for [facility] for GI due to size of his family and that his wife is not working due to her cancer.  Rosanna Pressley RN

## 2018-09-14 ENCOUNTER — HOSPITAL ENCOUNTER (OUTPATIENT)
Dept: LAB | Age: 33
Discharge: HOME | End: 2018-09-14

## 2018-09-14 LAB
ALBUMIN SERPL-MCNC: 4.4 G/DL (ref 3.5–5)
ALBUMIN/GLOB SERPL: 1.3 {RATIO} (ref 1.1–2.2)
ALP SERPL-CCNC: 91 U/L (ref 45–117)
ALT SERPL-CCNC: 79 U/L (ref 12–78)
ANION GAP SERPL CALC-SCNC: 8 MMOL/L (ref 5–15)
AST SERPL-CCNC: 33 U/L (ref 15–37)
BILIRUB SERPL-MCNC: 0.5 MG/DL (ref 0.2–1)
BUN SERPL-MCNC: 21 MG/DL (ref 6–20)
BUN/CREAT SERPL: 29 (ref 12–20)
CALCIUM SERPL-MCNC: 8.4 MG/DL (ref 8.5–10.1)
CHLORIDE SERPL-SCNC: 105 MMOL/L (ref 97–108)
CHOLEST SERPL-MCNC: 187 MG/DL
CO2 SERPL-SCNC: 27 MMOL/L (ref 21–32)
CREAT SERPL-MCNC: 0.73 MG/DL (ref 0.7–1.3)
GLOBULIN SER CALC-MCNC: 3.3 G/DL (ref 2–4)
GLUCOSE SERPL-MCNC: 90 MG/DL (ref 65–100)
HDLC SERPL-MCNC: 31 MG/DL
HDLC SERPL: 6 {RATIO} (ref 0–5)
LDLC SERPL CALC-MCNC: ABNORMAL MG/DL (ref 0–100)
LDLC SERPL DIRECT ASSAY-MCNC: 98 MG/DL (ref 0–100)
LIPID PROFILE,FLP: ABNORMAL
POTASSIUM SERPL-SCNC: 4.3 MMOL/L (ref 3.5–5.1)
PROT SERPL-MCNC: 7.7 G/DL (ref 6.4–8.2)
SODIUM SERPL-SCNC: 140 MMOL/L (ref 136–145)
TRIGL SERPL-MCNC: 494 MG/DL (ref ?–150)
TSH SERPL DL<=0.05 MIU/L-ACNC: 2.52 UIU/ML (ref 0.36–3.74)
VLDLC SERPL CALC-MCNC: ABNORMAL MG/DL

## 2018-09-14 PROCEDURE — 80061 LIPID PANEL: CPT | Performed by: NURSE PRACTITIONER

## 2018-09-14 PROCEDURE — 83721 ASSAY OF BLOOD LIPOPROTEIN: CPT | Performed by: NURSE PRACTITIONER

## 2018-09-14 PROCEDURE — 84443 ASSAY THYROID STIM HORMONE: CPT | Performed by: NURSE PRACTITIONER

## 2018-09-14 PROCEDURE — 80053 COMPREHEN METABOLIC PANEL: CPT | Performed by: NURSE PRACTITIONER

## 2018-10-24 ENCOUNTER — HOSPITAL ENCOUNTER (OUTPATIENT)
Dept: LAB | Age: 33
Discharge: HOME | End: 2018-10-24

## 2018-10-24 LAB
BASOPHILS # BLD: 0 K/UL (ref 0–0.1)
BASOPHILS NFR BLD: 1 % (ref 0–1)
CRP SERPL-MCNC: <0.29 MG/DL (ref 0–0.6)
DIFFERENTIAL METHOD BLD: ABNORMAL
EOSINOPHIL # BLD: 0.1 K/UL (ref 0–0.4)
EOSINOPHIL NFR BLD: 3 % (ref 0–7)
ERYTHROCYTE [DISTWIDTH] IN BLOOD BY AUTOMATED COUNT: 13.5 % (ref 11.5–14.5)
HCT VFR BLD AUTO: 45.9 % (ref 36.6–50.3)
HGB BLD-MCNC: 14.4 G/DL (ref 12.1–17)
IMM GRANULOCYTES # BLD: 0 K/UL (ref 0–0.04)
IMM GRANULOCYTES NFR BLD AUTO: 0 % (ref 0–0.5)
LYMPHOCYTES # BLD: 1.8 K/UL (ref 0.8–3.5)
LYMPHOCYTES NFR BLD: 46 % (ref 12–49)
MCH RBC QN AUTO: 29 PG (ref 26–34)
MCHC RBC AUTO-ENTMCNC: 31.4 G/DL (ref 30–36.5)
MCV RBC AUTO: 92.5 FL (ref 80–99)
MONOCYTES # BLD: 0.4 K/UL (ref 0–1)
MONOCYTES NFR BLD: 9 % (ref 5–13)
NEUTS SEG # BLD: 1.6 K/UL (ref 1.8–8)
NEUTS SEG NFR BLD: 40 % (ref 32–75)
NRBC # BLD: 0 K/UL (ref 0–0.01)
NRBC BLD-RTO: 0 PER 100 WBC
PLATELET # BLD AUTO: 194 K/UL (ref 150–400)
PMV BLD AUTO: 12 FL (ref 8.9–12.9)
RBC # BLD AUTO: 4.96 M/UL (ref 4.1–5.7)
WBC # BLD AUTO: 4 K/UL (ref 4.1–11.1)

## 2018-10-24 PROCEDURE — 86140 C-REACTIVE PROTEIN: CPT | Performed by: NURSE PRACTITIONER

## 2018-10-24 PROCEDURE — 85025 COMPLETE CBC W/AUTO DIFF WBC: CPT | Performed by: NURSE PRACTITIONER

## 2018-11-06 ENCOUNTER — HOSPITAL ENCOUNTER (OUTPATIENT)
Dept: LAB | Age: 33
Discharge: HOME | End: 2018-11-06

## 2018-11-06 LAB
ANION GAP SERPL CALC-SCNC: 11 MMOL/L (ref 5–15)
BUN SERPL-MCNC: 14 MG/DL (ref 6–20)
BUN/CREAT SERPL: 19 (ref 12–20)
CALCIUM SERPL-MCNC: 9 MG/DL (ref 8.5–10.1)
CHLORIDE SERPL-SCNC: 104 MMOL/L (ref 97–108)
CO2 SERPL-SCNC: 27 MMOL/L (ref 21–32)
CREAT SERPL-MCNC: 0.74 MG/DL (ref 0.7–1.3)
GLUCOSE SERPL-MCNC: 106 MG/DL (ref 65–100)
POTASSIUM SERPL-SCNC: 4.2 MMOL/L (ref 3.5–5.1)
SODIUM SERPL-SCNC: 142 MMOL/L (ref 136–145)

## 2018-11-06 PROCEDURE — 80048 BASIC METABOLIC PNL TOTAL CA: CPT | Performed by: NURSE PRACTITIONER

## 2019-01-18 ENCOUNTER — HOSPITAL ENCOUNTER (OUTPATIENT)
Dept: LAB | Age: 34
Discharge: HOME | End: 2019-01-18

## 2019-01-18 LAB
AMORPH CRY URNS QL MICRO: ABNORMAL
APPEARANCE UR: ABNORMAL
BACTERIA URNS QL MICRO: ABNORMAL /HPF
BILIRUB UR QL: NEGATIVE
CHOLEST SERPL-MCNC: 202 MG/DL
COLOR UR: ABNORMAL
EPITH CASTS URNS QL MICRO: ABNORMAL /LPF
EST. AVERAGE GLUCOSE BLD GHB EST-MCNC: 134 MG/DL
GLUCOSE UR STRIP.AUTO-MCNC: NEGATIVE MG/DL
HBA1C MFR BLD: 6.3 % (ref 4.2–6.3)
HDLC SERPL-MCNC: 30 MG/DL
HDLC SERPL: 6.7 {RATIO} (ref 0–5)
HGB UR QL STRIP: NEGATIVE
KETONES UR QL STRIP.AUTO: NEGATIVE MG/DL
LDLC SERPL CALC-MCNC: ABNORMAL MG/DL (ref 0–100)
LDLC SERPL DIRECT ASSAY-MCNC: 122 MG/DL (ref 0–100)
LEUKOCYTE ESTERASE UR QL STRIP.AUTO: NEGATIVE
LIPASE SERPL-CCNC: 172 U/L (ref 73–393)
LIPID PROFILE,FLP: ABNORMAL
MUCOUS THREADS URNS QL MICRO: ABNORMAL /LPF
NITRITE UR QL STRIP.AUTO: NEGATIVE
PH UR STRIP: 6 [PH] (ref 5–8)
PROT UR STRIP-MCNC: NEGATIVE MG/DL
RBC #/AREA URNS HPF: ABNORMAL /HPF (ref 0–5)
SP GR UR REFRACTOMETRY: 1.03 (ref 1–1.03)
TRIGL SERPL-MCNC: 432 MG/DL (ref ?–150)
UROBILINOGEN UR QL STRIP.AUTO: 0.2 EU/DL (ref 0.2–1)
VLDLC SERPL CALC-MCNC: ABNORMAL MG/DL
WBC URNS QL MICRO: ABNORMAL /HPF (ref 0–4)

## 2019-01-18 PROCEDURE — 83690 ASSAY OF LIPASE: CPT

## 2019-01-18 PROCEDURE — 81003 URINALYSIS AUTO W/O SCOPE: CPT

## 2019-01-18 PROCEDURE — 83721 ASSAY OF BLOOD LIPOPROTEIN: CPT

## 2019-01-18 PROCEDURE — 83036 HEMOGLOBIN GLYCOSYLATED A1C: CPT

## 2019-01-18 PROCEDURE — 80061 LIPID PANEL: CPT

## 2019-08-16 ENCOUNTER — HOSPITAL ENCOUNTER (OUTPATIENT)
Dept: LAB | Age: 34
Discharge: HOME | End: 2019-08-16

## 2019-08-16 LAB
HIV 1+2 AB+HIV1 P24 AG SERPL QL IA: NONREACTIVE
HIV12 RESULT COMMENT, HHIVC: NORMAL

## 2019-08-16 PROCEDURE — 82607 VITAMIN B-12: CPT

## 2019-08-16 PROCEDURE — 86038 ANTINUCLEAR ANTIBODIES: CPT

## 2019-08-16 PROCEDURE — 87389 HIV-1 AG W/HIV-1&-2 AB AG IA: CPT

## 2019-08-16 PROCEDURE — 86225 DNA ANTIBODY NATIVE: CPT

## 2019-08-17 LAB
FOLATE SERPL-MCNC: 15.9 NG/ML (ref 5–21)
VIT B12 SERPL-MCNC: 428 PG/ML (ref 193–986)

## 2019-08-19 LAB
ANA SER QL: POSITIVE
CENTROMERE B AB SER-ACNC: 1 AI (ref 0–0.9)
CHROMATIN AB SERPL-ACNC: <0.2 AI (ref 0–0.9)
DSDNA AB SER-ACNC: <1 IU/ML (ref 0–9)
ENA JO1 AB SER-ACNC: <0.2 AI (ref 0–0.9)
ENA RNP AB SER-ACNC: 0.4 AI (ref 0–0.9)
ENA SCL70 AB SER-ACNC: <0.2 AI (ref 0–0.9)
ENA SM AB SER-ACNC: <0.2 AI (ref 0–0.9)
ENA SS-A AB SER-ACNC: <0.2 AI (ref 0–0.9)
ENA SS-B AB SER-ACNC: <0.2 AI (ref 0–0.9)
SEE BELOW, 164869: ABNORMAL